# Patient Record
Sex: MALE | Race: OTHER | HISPANIC OR LATINO | URBAN - METROPOLITAN AREA
[De-identification: names, ages, dates, MRNs, and addresses within clinical notes are randomized per-mention and may not be internally consistent; named-entity substitution may affect disease eponyms.]

---

## 2021-09-04 ENCOUNTER — EMERGENCY (EMERGENCY)
Facility: HOSPITAL | Age: 36
LOS: 1 days | Discharge: HOME | End: 2021-09-04
Attending: EMERGENCY MEDICINE
Payer: COMMERCIAL

## 2021-09-04 VITALS
HEART RATE: 87 BPM | SYSTOLIC BLOOD PRESSURE: 115 MMHG | TEMPERATURE: 99 F | DIASTOLIC BLOOD PRESSURE: 73 MMHG | RESPIRATION RATE: 16 BRPM | OXYGEN SATURATION: 97 % | WEIGHT: 179.9 LBS

## 2021-09-04 VITALS
DIASTOLIC BLOOD PRESSURE: 84 MMHG | HEART RATE: 86 BPM | RESPIRATION RATE: 16 BRPM | OXYGEN SATURATION: 99 % | SYSTOLIC BLOOD PRESSURE: 116 MMHG

## 2021-09-04 LAB
RAPID RVP RESULT: SIGNIFICANT CHANGE UP
S PYO AG SPEC QL IA: NEGATIVE — SIGNIFICANT CHANGE UP
SARS-COV-2 RNA SPEC QL NAA+PROBE: SIGNIFICANT CHANGE UP

## 2021-09-04 PROCEDURE — 99283 EMERGENCY DEPT VISIT LOW MDM: CPT

## 2021-09-04 PROCEDURE — 87880 STREP A ASSAY W/OPTIC: CPT

## 2021-09-04 PROCEDURE — 87081 CULTURE SCREEN ONLY: CPT

## 2021-09-04 PROCEDURE — 0225U NFCT DS DNA&RNA 21 SARSCOV2: CPT

## 2021-09-04 PROCEDURE — 99284 EMERGENCY DEPT VISIT MOD MDM: CPT

## 2021-09-04 RX ORDER — IBUPROFEN 200 MG
600 TABLET ORAL ONCE
Refills: 0 | Status: COMPLETED | OUTPATIENT
Start: 2021-09-04 | End: 2021-09-04

## 2021-09-04 RX ADMIN — Medication 600 MILLIGRAM(S): at 14:14

## 2021-09-04 NOTE — ED PROVIDER NOTE - NS ED ROS FT
Gen: Endorses fever, chills, generalized weakness  CV: Denies chest pain, palpitations  HEENT: Denies neck pain, headache, vision changes  Skin: Denies rash, erythema, color changes  Resp: Denies SOB, cough  Endo: Denies sensitivity to heat, cold, increased urination  GI: Denies constipation, nausea, vomiting, diarrhea  Msk: Denies back pain, LE swelling, extremity pain  : Denies dysuria, increased frequency  Neuro: Denies LOC, focal weakness, numbness, tingling  Psych: Denies hx of psych, SI, HI

## 2021-09-04 NOTE — ED PROVIDER NOTE - CLINICAL SUMMARY MEDICAL DECISION MAKING FREE TEXT BOX
Joseph Frankel PGY3: 37 yo M with fever and throat pain. VSS. Patient looks well and is non toxic appearing. PE as above. Consider viral tonsilitis vs strep. No concern for PTA. Low liklihood COVID as patient vaccinated. Will get rapid strep, RVP, treat symptoms, and reassess. Joseph Frankel PGY3: 37 yo M with fever and throat pain. VSS. Patient looks well and is non toxic appearing. PE as above. Consider viral tonsilitis vs strep. No concern for PTA. Low liklihood COVID as patient vaccinated. Will get rapid strep, RVP, treat symptoms, and reassess.  Efe: 36 year old male with fever and throat pain. well appearing, nontoxic. saturating well. will get rapid strep, covid, rvp, symptomatic treatment, reassess.

## 2021-09-04 NOTE — ED PROVIDER NOTE - PHYSICAL EXAMINATION
Gen: Alert and oriented. Lying comfortably in bed. Answering questions appropriately  HEENT: exudates on right tonsils. uvula midline. handling secretions, extra occular movements intact, no nasal discharge, mucous membranes moist  CV: Regular rate and rhythm, +S1/S2, no murmurs/rubs/gallops,   Resp: Clear to ausculation bilaterally, no wheezes/rhonchi/rales  GI: Abdomen soft non-distended, non tender to palpation, no masses  MSK: No open wounds, no bruising, no LE edema, Homans sign negative bl  Neuro: A&Ox4, following commands, moving all four extremities spontaneously  Psych: appropriate mood

## 2021-09-04 NOTE — ED PROVIDER NOTE - OBJECTIVE STATEMENT
37 yo M with no pmh presenting for body aches, fever x 4 days. Associated with sore throat x 2 days. Denies issues swallowing, voice changes. Fever at home to 101. Otherwise denies any symptoms.

## 2021-09-04 NOTE — ED PROVIDER NOTE - NSFOLLOWUPINSTRUCTIONS_ED_ALL_ED_FT
You were seen for sore throat.    It is likely secondary to a viral infection.    Please call the ED back for your viral panel results.     Please see generalized info regarding throat pain below:    Pharyngitis    Pharyngitis is inflammation of your pharynx, which is typically caused by a viral or bacterial infection. Pharyngitis can be contagious and may spread from person to person through intimate contact, coughing, sneezing, or sharing personal items and utensils. Symptoms of pharyngitis may include sore throat, fever, headache, or swollen lymph nodes. If you are prescribed antibiotics, make sure you finish them even if you start to feel better. Gargle with salt water as often as every 1-2 hours to soothe your throat. Throat lozenges (if you are not at risk for choking) or sprays may be used to soothe your throat.    SEEK IMMEDIATE MEDICAL CARE IF YOU HAVE ANY OF THE FOLLOWING SYMPTOMS: neck stiffness, drooling, hoarseness or change in voice, inability to swallow liquids, vomiting, or trouble breathing.

## 2021-09-04 NOTE — ED PROVIDER NOTE - PATIENT PORTAL LINK FT
You can access the FollowMyHealth Patient Portal offered by [facility] by registering at the following website: [email or web address]. By joining [employer]s FollowMyHealth portal, you will also be able to view your health information using other applications (apps) compatible with our system.

## 2021-09-04 NOTE — ED PROVIDER NOTE - PROGRESS NOTE DETAILS
Joseph Frankel PGY3: patient feeling better. strep negative. RVP pending. Has pcp follow up. Discussed plan and return precautions with patient who understands and agrees. All questions answered.

## 2021-09-04 NOTE — ED POST DISCHARGE NOTE - ADDITIONAL DOCUMENTATION
Pt called for RVP results. results given. pt instructed on supportive care, given followup instructions and return precautions - Olegario Richard PA-C

## 2021-09-04 NOTE — ED ADULT NURSE NOTE - OBJECTIVE STATEMENT
36y m pt c/o 4 days of fever, sore throat, weakness, headache and bodyaches; pt states yesterday fever to 103f; pt taking 500mg of tylenol; fevers resolve and then come back; pt has not called pcp or gone to urgent care; pt had covid vaccine in march; pt denies known sick contact; aox3; no resp distress; no sob; no diff breathing; no chest pain; no abd pain; no n/v/d; denies cough/congestion; no numbness/tingling; no dysuria/hematuria; no blood in stool; call bell in hand; safety/comfort maintained
